# Patient Record
Sex: FEMALE | Race: WHITE | NOT HISPANIC OR LATINO | Employment: UNEMPLOYED
[De-identification: names, ages, dates, MRNs, and addresses within clinical notes are randomized per-mention and may not be internally consistent; named-entity substitution may affect disease eponyms.]

---

## 2019-11-18 ENCOUNTER — IMAGING SERVICES (OUTPATIENT)
Dept: OTHER | Age: 44
End: 2019-11-18

## 2021-07-13 ENCOUNTER — IMAGING SERVICES (OUTPATIENT)
Dept: OTHER | Age: 46
End: 2021-07-13

## 2021-11-19 ENCOUNTER — APPOINTMENT (OUTPATIENT)
Dept: MRI IMAGING | Facility: HOSPITAL | Age: 46
End: 2021-11-19
Attending: EMERGENCY MEDICINE
Payer: COMMERCIAL

## 2021-11-19 ENCOUNTER — APPOINTMENT (OUTPATIENT)
Dept: GENERAL RADIOLOGY | Facility: HOSPITAL | Age: 46
End: 2021-11-19
Payer: COMMERCIAL

## 2021-11-19 ENCOUNTER — HOSPITAL ENCOUNTER (EMERGENCY)
Facility: HOSPITAL | Age: 46
Discharge: HOME | End: 2021-11-20
Payer: COMMERCIAL

## 2021-11-19 DIAGNOSIS — R20.2 PARESTHESIAS: ICD-10-CM

## 2021-11-19 DIAGNOSIS — R07.89 CHEST PAIN, ATYPICAL: Primary | ICD-10-CM

## 2021-11-19 PROCEDURE — 70551 MRI BRAIN STEM W/O DYE: CPT | Performed by: EMERGENCY MEDICINE

## 2021-11-19 PROCEDURE — 80048 BASIC METABOLIC PNL TOTAL CA: CPT

## 2021-11-19 PROCEDURE — 71045 X-RAY EXAM CHEST 1 VIEW: CPT

## 2021-11-19 PROCEDURE — 36415 COLL VENOUS BLD VENIPUNCTURE: CPT

## 2021-11-19 PROCEDURE — 85379 FIBRIN DEGRADATION QUANT: CPT | Performed by: EMERGENCY MEDICINE

## 2021-11-19 PROCEDURE — 93010 ELECTROCARDIOGRAM REPORT: CPT | Performed by: INTERNAL MEDICINE

## 2021-11-19 PROCEDURE — 93005 ELECTROCARDIOGRAM TRACING: CPT

## 2021-11-19 PROCEDURE — 84443 ASSAY THYROID STIM HORMONE: CPT | Performed by: EMERGENCY MEDICINE

## 2021-11-19 PROCEDURE — 85025 COMPLETE CBC W/AUTO DIFF WBC: CPT

## 2021-11-19 PROCEDURE — 83735 ASSAY OF MAGNESIUM: CPT | Performed by: EMERGENCY MEDICINE

## 2021-11-19 PROCEDURE — 99284 EMERGENCY DEPT VISIT MOD MDM: CPT

## 2021-11-19 PROCEDURE — 84484 ASSAY OF TROPONIN QUANT: CPT

## 2021-11-20 VITALS
DIASTOLIC BLOOD PRESSURE: 87 MMHG | TEMPERATURE: 99 F | WEIGHT: 160 LBS | SYSTOLIC BLOOD PRESSURE: 155 MMHG | OXYGEN SATURATION: 96 % | RESPIRATION RATE: 14 BRPM | HEART RATE: 79 BPM

## 2021-11-20 NOTE — ED INITIAL ASSESSMENT (HPI)
Patient complains of bilateral arm numbness, tingling, and pain, worse on the left, intermittently x 5 days. Was seen at other hospital for same on Monday, workup negative. Also reporting generalized weakness and intermittent near syncope.

## 2021-11-20 NOTE — ED PROVIDER NOTES
Patient Seen in: [facility] AND [facility] Emergency Department    History   Patient presents with:  Numbness Weakness    Stated Complaint: left sided pain, tingling, sob     HPI    18-year-old female with past medical history of PCKD presenting for evaluation of stated in HPI.     Physical Exam     ED Triage Vitals [11/19/21 7619]   BP (!) 162/95   Pulse 78   Resp 18   Temp 98.8 °F (37.1 °C)   Temp src Oral   SpO2 99 %   O2 Device None (Room air)       Current:BP (!) 162/95   Pulse 77   Temp 98.8 °F (37.1 °C) (Oral Reason For Exam (entered by Technologist):  Left sided chest pain, tingling and shortness of breath. History of hypertension.   Other Notes (entered by Technologist): erpdo 1 rm 16    Additional Information (per [employer]iologist): Left-sided chest pain, results were faxed/finalized at 12:43 AM central standard time. If you would like to discuss this case directly please call [phone] (extension [phone]). If you can not reach me at this number, do not leave a voicemail.  Please call the same number, Ext [phone] diagnosis)  Paresthesias    Disposition:  Discharge    Follow-up:  Your PCP    Call  For followup and re-evaluation. Medications Prescribed:  There are no discharge medications for this patient.

## 2022-11-10 ENCOUNTER — IMAGING SERVICES (OUTPATIENT)
Dept: OTHER | Age: 47
End: 2022-11-10

## 2023-01-16 ENCOUNTER — OFFICE VISIT (OUTPATIENT)
Dept: FAMILY MEDICINE | Facility: CLINIC | Age: 48
End: 2023-01-16
Payer: COMMERCIAL

## 2023-01-16 VITALS
WEIGHT: 150 LBS | HEART RATE: 84 BPM | RESPIRATION RATE: 16 BRPM | OXYGEN SATURATION: 98 % | TEMPERATURE: 98.7 F | DIASTOLIC BLOOD PRESSURE: 100 MMHG | SYSTOLIC BLOOD PRESSURE: 160 MMHG

## 2023-01-16 DIAGNOSIS — K21.00 GASTROESOPHAGEAL REFLUX DISEASE WITH ESOPHAGITIS WITHOUT HEMORRHAGE: Primary | ICD-10-CM

## 2023-01-16 DIAGNOSIS — R11.0 NAUSEA: ICD-10-CM

## 2023-01-16 DIAGNOSIS — K22.4 ESOPHAGEAL SPASM: ICD-10-CM

## 2023-01-16 PROCEDURE — 99203 OFFICE O/P NEW LOW 30 MIN: CPT

## 2023-01-16 RX ORDER — ONDANSETRON 4 MG/1
4 TABLET, ORALLY DISINTEGRATING ORAL ONCE
Status: COMPLETED | OUTPATIENT
Start: 2023-01-16 | End: 2023-01-16

## 2023-01-16 RX ORDER — ONDANSETRON 4 MG/1
4 TABLET, ORALLY DISINTEGRATING ORAL EVERY 8 HOURS PRN
Qty: 12 TABLET | Refills: 0 | Status: SHIPPED | OUTPATIENT
Start: 2023-01-16

## 2023-01-16 RX ADMIN — ONDANSETRON 4 MG: 4 TABLET, ORALLY DISINTEGRATING ORAL at 14:46

## 2023-01-16 ASSESSMENT — PAIN SCALES - GENERAL: PAINLEVEL: MODERATE PAIN (5)

## 2023-01-16 ASSESSMENT — ENCOUNTER SYMPTOMS
FATIGUE: 1
NEUROLOGICAL NEGATIVE: 1
CARDIOVASCULAR NEGATIVE: 1
HEARTBURN: 1
ABDOMINAL DISTENTION: 1
ABDOMINAL PAIN: 1
RESPIRATORY NEGATIVE: 1

## 2023-01-16 NOTE — PROGRESS NOTES
Assessment & Plan       ICD-10-CM    1. Gastroesophageal reflux disease with esophagitis without hemorrhage  K21.00 lidocaine (viscous) (XYLOCAINE) 2 % 15 mL, alum & mag hydroxide-simethicone (MAALOX) 15 mL GI Cocktail      2. Nausea  R11.0 ondansetron (ZOFRAN ODT) ODT tab 4 mg     ondansetron (ZOFRAN ODT) 4 MG ODT tab      3. Esophageal spasm  K22.4          Unfortunately we are unable to give fluids which Constanza reports usually helps her symptoms. Given Zofran and GI Cocktail in clinic. Patient rested and symptoms improved. Discussed that if symptoms persist or worsen, she will need to go to ED in case there is something underlying. She will take another Omperazole when at her hotel. Patient leaving for home ([place]) tonight, unable to get labs in time for patient.     Return if symptoms worsen or fail to improve.    At the end of the encounter, I discussed results, diagnosis, medications. Discussed red flags for immediate return to clinic/ER, as well as indications for follow up if no improvement. Patient understood and agreed to plan. Patient was stable for discharge.    Subjective     Constanza is a 47 year old female who presents to clinic today for the following health issues:  Chief Complaint   Patient presents with     Nausea     Abdominal Pain     Mainly started this am. And back spasms     Constanza presents with reports of abdominal pain and back spasms as well as nausea since this AM. She reports this occurs frequently for her, about once per month. She tried Omeprazole this AM and increasing fluids to help. Last BM was about 1 hour ago.           Review of Systems   Constitutional: Positive for fatigue.   Respiratory: Negative.    Cardiovascular: Negative.    Gastrointestinal: Positive for abdominal distention, abdominal pain and heartburn.   Genitourinary: Negative.    Skin: Negative.    Neurological: Negative.        Problem List:  There are no relevant problems documented for this patient.      History reviewed.  No pertinent past medical history.    Social History     Tobacco Use     Smoking status: Never     Smokeless tobacco: Never   Substance Use Topics     Alcohol use: Not on file           Objective    BP (!) 160/100   Pulse 84   Temp 98.7  F (37.1  C) (Tympanic)   Resp 16   Wt 68 kg (150 lb)   SpO2 98%   Physical Exam  Constitutional:       Appearance: Normal appearance.   HENT:      Head: Normocephalic and atraumatic.   Cardiovascular:      Rate and Rhythm: Normal rate and regular rhythm.      Heart sounds: Normal heart sounds.   Pulmonary:      Effort: Pulmonary effort is normal.      Breath sounds: Normal breath sounds.   Abdominal:      General: Abdomen is flat. Bowel sounds are normal. There is no distension.      Palpations: Abdomen is soft.      Tenderness: There is no abdominal tenderness.   Musculoskeletal:      Cervical back: Normal range of motion and neck supple.   Skin:     General: Skin is warm and dry.   Neurological:      General: No focal deficit present.      Mental Status: She is alert and oriented to person, place, and time.   Psychiatric:         Mood and Affect: Mood normal.         Thought Content: Thought content normal.              Leif Shepherd PA-C

## 2024-09-17 DIAGNOSIS — K31.84 GASTROPARESIS: ICD-10-CM

## 2024-09-17 DIAGNOSIS — R10.13 ABDOMINAL PAIN, EPIGASTRIC: Primary | ICD-10-CM

## 2024-09-27 ENCOUNTER — HOSPITAL ENCOUNTER (OUTPATIENT)
Dept: NUCLEAR MEDICINE | Age: 49
End: 2024-09-27
Attending: INTERNAL MEDICINE

## 2024-09-27 ENCOUNTER — HOSPITAL ENCOUNTER (OUTPATIENT)
Dept: MAMMOGRAPHY | Age: 49
End: 2024-09-27
Attending: INTERNAL MEDICINE

## 2024-09-27 DIAGNOSIS — Z12.31 ENCOUNTER FOR SCREENING MAMMOGRAM FOR MALIGNANT NEOPLASM OF BREAST: ICD-10-CM

## 2024-09-27 DIAGNOSIS — K31.84 GASTROPARESIS: ICD-10-CM

## 2024-09-27 PROCEDURE — 77067 SCR MAMMO BI INCL CAD: CPT

## 2024-09-27 PROCEDURE — A9541 TC99M SULFUR COLLOID: HCPCS | Performed by: INTERNAL MEDICINE

## 2024-09-27 PROCEDURE — 10006150 HB RX 343: Performed by: INTERNAL MEDICINE

## 2024-09-27 RX ORDER — TECHNETIUM TC 99M SULFUR COLLOID 2 MG
1.1 KIT MISCELLANEOUS ONCE
Status: COMPLETED | OUTPATIENT
Start: 2024-09-27 | End: 2024-09-27

## 2024-09-27 RX ADMIN — TECHNETIUM TC 99M SULFUR COLLOID 1.1 MILLICURIE: KIT at 10:30

## 2024-10-03 ENCOUNTER — HOSPITAL ENCOUNTER (OUTPATIENT)
Dept: GENERAL RADIOLOGY | Age: 49
Discharge: HOME | End: 2024-10-03
Attending: INTERNAL MEDICINE

## 2024-10-03 DIAGNOSIS — R10.13 ABDOMINAL PAIN, EPIGASTRIC: ICD-10-CM

## 2024-10-03 PROCEDURE — 74221 X-RAY XM ESOPHAGUS 2CNTRST: CPT

## 2024-10-03 PROCEDURE — 10002805 HB CONTRAST AGENT: Performed by: INTERNAL MEDICINE

## 2024-10-03 RX ADMIN — BARIUM SULFATE 140 ML: 980 POWDER, FOR SUSPENSION ORAL at 14:46

## 2024-10-03 RX ADMIN — ANTACID/ANTIFLATULENT 1 PACKET: 380; 550; 10; 10 GRANULE, EFFERVESCENT ORAL at 14:46

## 2024-10-03 RX ADMIN — BARIUM SULFATE 40 ML: 240 SUSPENSION ORAL at 14:46
